# Patient Record
Sex: MALE | Race: WHITE | Employment: FULL TIME | URBAN - METROPOLITAN AREA
[De-identification: names, ages, dates, MRNs, and addresses within clinical notes are randomized per-mention and may not be internally consistent; named-entity substitution may affect disease eponyms.]

---

## 2017-09-05 ENCOUNTER — HOSPITAL ENCOUNTER (EMERGENCY)
Age: 47
Discharge: HOME | End: 2017-09-05
Attending: EMERGENCY MEDICINE
Payer: SELF-PAY

## 2017-09-05 VITALS
RESPIRATION RATE: 18 BRPM | HEART RATE: 76 BPM | BODY MASS INDEX: 23.8 KG/M2 | DIASTOLIC BLOOD PRESSURE: 78 MMHG | TEMPERATURE: 98.2 F | OXYGEN SATURATION: 100 % | WEIGHT: 170 LBS | HEIGHT: 71 IN | SYSTOLIC BLOOD PRESSURE: 118 MMHG

## 2017-09-05 DIAGNOSIS — M54.50 ACUTE RIGHT-SIDED LOW BACK PAIN WITHOUT SCIATICA: Primary | ICD-10-CM

## 2017-09-05 LAB
ALBUMIN SERPL-MCNC: 3.9 G/DL (ref 3.5–5)
ALBUMIN/GLOB SERPL: 0.9 {RATIO} (ref 1.2–3.5)
ALP SERPL-CCNC: 92 U/L (ref 50–136)
ALT SERPL-CCNC: 18 U/L (ref 12–65)
ANION GAP SERPL CALC-SCNC: 7 MMOL/L (ref 7–16)
AST SERPL-CCNC: 10 U/L (ref 15–37)
BASOPHILS # BLD: 0 K/UL (ref 0–0.2)
BASOPHILS NFR BLD: 0 % (ref 0–2)
BILIRUB SERPL-MCNC: 0.3 MG/DL (ref 0.2–1.1)
BUN SERPL-MCNC: 8 MG/DL (ref 6–23)
CALCIUM SERPL-MCNC: 9.3 MG/DL (ref 8.3–10.4)
CHLORIDE SERPL-SCNC: 105 MMOL/L (ref 98–107)
CO2 SERPL-SCNC: 27 MMOL/L (ref 21–32)
CREAT SERPL-MCNC: 1 MG/DL (ref 0.8–1.5)
DIFFERENTIAL METHOD BLD: ABNORMAL
EOSINOPHIL # BLD: 0.4 K/UL (ref 0–0.8)
EOSINOPHIL NFR BLD: 3 % (ref 0.5–7.8)
ERYTHROCYTE [DISTWIDTH] IN BLOOD BY AUTOMATED COUNT: 14 % (ref 11.9–14.6)
GLOBULIN SER CALC-MCNC: 4.3 G/DL (ref 2.3–3.5)
GLUCOSE SERPL-MCNC: 80 MG/DL (ref 65–100)
HCT VFR BLD AUTO: 45.6 % (ref 41.1–50.3)
HGB BLD-MCNC: 16 G/DL (ref 13.6–17.2)
IMM GRANULOCYTES # BLD: 0 K/UL (ref 0–0.5)
IMM GRANULOCYTES NFR BLD: 0.2 % (ref 0–5)
LYMPHOCYTES # BLD: 2.9 K/UL (ref 0.5–4.6)
LYMPHOCYTES NFR BLD: 24 % (ref 13–44)
MCH RBC QN AUTO: 31.7 PG (ref 26.1–32.9)
MCHC RBC AUTO-ENTMCNC: 35.1 G/DL (ref 31.4–35)
MCV RBC AUTO: 90.5 FL (ref 79.6–97.8)
MONOCYTES # BLD: 0.9 K/UL (ref 0.1–1.3)
MONOCYTES NFR BLD: 7 % (ref 4–12)
NEUTS SEG # BLD: 8 K/UL (ref 1.7–8.2)
NEUTS SEG NFR BLD: 66 % (ref 43–78)
PLATELET # BLD AUTO: 310 K/UL (ref 150–450)
PMV BLD AUTO: 9 FL (ref 10.8–14.1)
POTASSIUM SERPL-SCNC: 4 MMOL/L (ref 3.5–5.1)
PROT SERPL-MCNC: 8.2 G/DL (ref 6.3–8.2)
RBC # BLD AUTO: 5.04 M/UL (ref 4.23–5.67)
SODIUM SERPL-SCNC: 139 MMOL/L (ref 136–145)
WBC # BLD AUTO: 12.2 K/UL (ref 4.3–11.1)

## 2017-09-05 PROCEDURE — 85025 COMPLETE CBC W/AUTO DIFF WBC: CPT | Performed by: EMERGENCY MEDICINE

## 2017-09-05 PROCEDURE — 80053 COMPREHEN METABOLIC PANEL: CPT | Performed by: EMERGENCY MEDICINE

## 2017-09-05 PROCEDURE — 99283 EMERGENCY DEPT VISIT LOW MDM: CPT | Performed by: EMERGENCY MEDICINE

## 2017-09-05 RX ORDER — TRAMADOL HYDROCHLORIDE 50 MG/1
50 TABLET ORAL
Qty: 17 TAB | Refills: 0 | Status: SHIPPED | OUTPATIENT
Start: 2017-09-05

## 2017-09-05 NOTE — ED TRIAGE NOTES
Pt arrive c/o right flank pain that started yesterday. Denies injury or fall. Denies n/v/d. Denies dysuria.

## 2017-09-05 NOTE — ED NOTES
I have reviewed discharge instructions with the patient. The patient verbalized understanding. Prescription given. Pt education for medication including not to drink alcohol, drive, work, or make important decisions given. Pt verbalized understanding of teaching. No questions when given opportunity to ask. Patient ambulatory out of ED with steady gait in NAD. Esign not available.

## 2017-09-05 NOTE — DISCHARGE INSTRUCTIONS
Rest  Ice back 20-30 minutes 3-4 times a day for 3-4 days  Motrin 400mg every 6 hours      Rotator Cuff Problems: Care Instructions  Your Care Instructions  The rotator cuff is a group of tendons and muscles around the shoulder that keeps the shoulder joint stable and allows you to raise and rotate your arm. Over time, daily wear and exercise can cause the tendons to rub on the bones of your shoulder. This is called impingement. This condition may cause the tendons to bruise, degenerate, or tear. In many people, these problems do not cause pain. When they do cause pain, you can use rest, physical therapy, ice and heat, and anti-inflammatory medicine to reduce pain and swelling. If you still have pain after trying these treatments, you and your doctor can discuss having a steroid injection or surgery. Follow-up care is a key part of your treatment and safety. Be sure to make and go to all appointments, and call your doctor if you are having problems. It's also a good idea to know your test results and keep a list of the medicines you take. How can you care for yourself at home? · Be safe with medicines. Read and follow all instructions on the label. ¨ If the doctor gave you a prescription medicine for pain, take it as prescribed. ¨ If you are not taking a prescription pain medicine, ask your doctor if you can take an over-the-counter medicine. · Put ice or a cold pack on your shoulder for 10 to 20 minutes at a time. Try to do this every 1 to 2 hours for the next 3 days (when you are awake). Put a thin cloth between the ice pack and your skin. · After 3 days, put a warm, wet towel on your shoulder. This is to relax the muscles and increase blood flow. While holding the towel on your shoulder, lean forward so your arm hangs freely, and gently swing your arm back and forth like a pendulum. You also can do this standing under a warm shower. · Follow your doctor's advice for physical therapy.  When your doctor says it is okay, try these stretching exercises. Do them slowly to avoid injury. Put a warm, wet towel on your shoulder before exercising. Stop any exercise that increases pain. ¨ Range-of-motion exercises. If it is not too painful, stretch your arm in four directions: across the body, up the back, to the side, and overhead. ¨ Pendulum exercise. Lean forward and hold onto a table or the back of a chair with your good arm. Bend at the waist, letting the arm with the sore shoulder hang straight down. Swing your arm back and forth like a pendulum, then in circles that start small and slowly grow larger. This exercise does not use the arm muscles. Instead, use your legs and your hips to create movement that makes your arm swing freely. Try this for about 5 minutes, several times a day. ¨ Wall climbing (to the side). Stand with your side to a wall so that your fingers can just touch it. Then turn so your body is turned slightly toward the wall. Walk the fingers of your injured arm up the wall as high as pain permits. Try not to shrug your shoulder up toward your ear as you move your arm up. Hold that position for a count of 15 to 30 seconds. Walk your fingers down to the starting position. Repeat 2 to 4 times, trying to reach higher each time. ¨ Wall climbing (to the front). Face a wall, standing so your fingers can just touch it. Walk the fingers of your affected arm up the wall as high as pain permits. Try not to shrug your shoulder up toward your ear as you move your arm up. Hold that position for a count of 15 to 30 seconds. Slowly walk your fingers to the starting position. Repeat 2 to 4 times, trying to reach higher each time. · Rest your shoulder when you are not doing stretches and other exercises. Your doctor may tell you to wait for the pain to go away before doing exercises. Do not lift heavy bags of groceries, play sports, or do anything else that makes you twist or stress your shoulder.  Avoid activities where you move your affected arm above your head. When should you call for help? Call your doctor now or seek immediate medical care if:  · You have severe pain. · You cannot move your shoulder or arm. · You have tingling or numbness in your arm or hand. · Your arm or hand is cool or pale. Watch closely for changes in your health, and be sure to contact your doctor if:  · Your pain gets worse. · You have new or worse swelling in your arm or hand. · You do not get better as expected. Where can you learn more? Go to http://brett-tony.info/. Enter E207 in the search box to learn more about \"Rotator Cuff Problems: Care Instructions. \"  Current as of: March 21, 2017  Content Version: 11.3  © 3395-4601 [employer]. Care instructions adapted under license by [employer] (which disclaims liability or warranty for this information). If you have questions about a medical condition or this instruction, always ask your healthcare professional. [address] any warranty or liability for your use of this information.

## 2017-09-05 NOTE — LETTER
[facility] [facility] - [place], [place] EMERGENCY DEPT One [address] [address] 
[phone] Work/School Note Date: 9/5/2017 To Whom It May concern: Earlene Burch was seen and treated today in the emergency room by the following provider(s): 
Attending Provider: Nataliia Pretty MD. Earlene Burch may return to work on 9/7/17. Sincerely, Tigre Harper RN

## 2017-09-05 NOTE — ED PROVIDER NOTES
[address] Emergency Department    Chief Complaint:  Right scapular pain  HPI:  Belia Cohen is a 51 y.o. male who complaints of pain in his back. Location: On the right side. At the inferior margin of the scapula. Quality: sharp  Associated Signs/Symptoms: no shortness of breath, no cough, no chest pain  Duration/Timing: started yesterday. Comes and go. Worse with movement or activity. Severity: moderate  Alleviating: rest  Aggravating: movement  Patient denies any inciting factors. 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 Historian:   patient    Review of Systems:    CARDIO: Denies: chest pain, palpitations   RESP:   Denies: cough, shortness of breath, dyspnea on exertion  MUSC: Per HPI      Past Medical History:  Past Medical History:   Diagnosis Date    Hypertension        Past Surgical History:   Procedure Laterality Date    [de-identified] ORTHOPAEDIC         Social History     Social History    Marital status:      Spouse name: N/A    Number of children: N/A    Years of education: N/A     Social History Main Topics    Smoking status: Current Every Day Smoker     Packs/day: 0.50    Smokeless tobacco: Never Used    Alcohol use No    Drug use: No    Sexual activity: Not Asked     Other Topics Concern    None     Social History Narrative       Previous Medications    CYCLOBENZAPRINE (FLEXERIL) 10 MG TABLET    Take 1 Tab by mouth three (3) times daily as needed for Muscle Spasm(s). IBUPROFEN (MOTRIN) 800 MG TABLET    Take 1 Tab by mouth every eight (8) hours as needed for Pain (TAKE WITH FOOD). No Known Allergies    Physical Exam:    Vitals:    09/05/17 0757   BP: 120/87   Pulse: 90   Resp: 20   Temp: 98.5 °F (36.9 °C)   SpO2: 100%     Vital signs were reviewed.   Pulse oximetry interpretation: normal    Constitutional: , well appearing, nontoxic  Head:   Atraumatic, normo-cephalic  Neck:   supple, FROM, no lymphadenopathy, no meningismus, no bony tenderness  Cardiovascular: regular rate and rhythm, no murmur, 2+ radial pulses  Respiratory:  clear to auscultation with no wheezes, rales, ronchi  Back:    FROM, no CVA tenderness, no midline or bony tenderness  Musculoskeletal: Right shoulder without deformity or tenderness. Pain with abduction of the right shoulder. Pain with external rotation. Skin:   dry, no rashes  Neurologic:  alert, oriented, CN II-XII intact,   Psychiatric:  Speech pattern and content normal     ______________________________________________________________________  Progress:    Well-appearing 48-year-old male atraumatic right scapular pain. Worse with deep breath movement of the arm. Better with rest.  No falls or accidents reported. Patient was unable to sleep on the affected side last night. Pain is worse with movement of the arm particularly abduction external rotation and resisted movement. It is tender to palpation at the inferior margin of the scapula.      ==================================================================  Medical Decision Making:  Differential Diagnosis for this patient includes: traumatic injury, rotator cuff injury,    This is a new problem that does need additional workup  Labs/Radiographs/ECG were ordered: no  Old records were reviewed: no  CT/US/XRay/MRI were visualized: no    The patient's problem is: new,previously undiagnosed  The Diagnostic Options are: minimal risk  The Management Options are: moderate risk  _____________________________________________________________________  Assessment/Plan:    Right shoulder pain. Possible rotator cuff problem  iice rest and anti-inflammatories.   ______________________________________________________________________    Condition:  good  Disposition:  home  Diagnosis:  Right upper back pain      Leobardo Dumont M.D.

## 2017-09-05 NOTE — LETTER
[facility] [facility] - [place] EMERGENCY DEPT One [address] [address] [phone] Work/School Note Date: 9/5/2017 To Whom It May concern: Dianelys Schmidt was seen and treated today in the emergency room by the following provider(s): 
Attending Provider: Veda Ojeda MD. Dianelys Schmidt may return to work on 9/6.  
 
Sincerely, 
 
 
 
 
Veda Ojeda MD

## 2020-12-16 ENCOUNTER — HOSPITAL ENCOUNTER (EMERGENCY)
Age: 50
Discharge: HOME | End: 2020-12-16
Attending: EMERGENCY MEDICINE

## 2020-12-16 ENCOUNTER — APPOINTMENT (OUTPATIENT)
Dept: GENERAL RADIOLOGY | Age: 50
End: 2020-12-16
Attending: EMERGENCY MEDICINE

## 2020-12-16 VITALS
TEMPERATURE: 97.8 F | DIASTOLIC BLOOD PRESSURE: 80 MMHG | RESPIRATION RATE: 16 BRPM | HEART RATE: 67 BPM | BODY MASS INDEX: 22.54 KG/M2 | HEIGHT: 71 IN | WEIGHT: 161 LBS | OXYGEN SATURATION: 99 % | SYSTOLIC BLOOD PRESSURE: 121 MMHG

## 2020-12-16 DIAGNOSIS — R07.9 CHEST PAIN, UNSPECIFIED TYPE: Primary | ICD-10-CM

## 2020-12-16 LAB
ALBUMIN SERPL-MCNC: 3.9 G/DL (ref 3.5–5)
ALBUMIN/GLOB SERPL: 1 {RATIO} (ref 1.2–3.5)
ALP SERPL-CCNC: 79 U/L (ref 50–136)
ALT SERPL-CCNC: 18 U/L (ref 12–65)
ANION GAP SERPL CALC-SCNC: 4 MMOL/L (ref 7–16)
AST SERPL-CCNC: 14 U/L (ref 15–37)
ATRIAL RATE: 80 BPM
BASOPHILS # BLD: 0.1 K/UL (ref 0–0.2)
BASOPHILS NFR BLD: 1 % (ref 0–2)
BILIRUB SERPL-MCNC: 0.3 MG/DL (ref 0.2–1.1)
BUN SERPL-MCNC: 9 MG/DL (ref 6–23)
CALCIUM SERPL-MCNC: 9.4 MG/DL (ref 8.3–10.4)
CALCULATED P AXIS, ECG09: 76 DEGREES
CALCULATED R AXIS, ECG10: 83 DEGREES
CALCULATED T AXIS, ECG11: 56 DEGREES
CHLORIDE SERPL-SCNC: 108 MMOL/L (ref 98–107)
CO2 SERPL-SCNC: 27 MMOL/L (ref 21–32)
CREAT SERPL-MCNC: 0.89 MG/DL (ref 0.8–1.5)
DIAGNOSIS, 93000: NORMAL
DIFFERENTIAL METHOD BLD: ABNORMAL
EOSINOPHIL # BLD: 0.4 K/UL (ref 0–0.8)
EOSINOPHIL NFR BLD: 3 % (ref 0.5–7.8)
ERYTHROCYTE [DISTWIDTH] IN BLOOD BY AUTOMATED COUNT: 12.9 % (ref 11.9–14.6)
GLOBULIN SER CALC-MCNC: 4.1 G/DL (ref 2.3–3.5)
GLUCOSE SERPL-MCNC: 79 MG/DL (ref 65–100)
HCT VFR BLD AUTO: 43.3 % (ref 41.1–50.3)
HGB BLD-MCNC: 14.4 G/DL (ref 13.6–17.2)
IMM GRANULOCYTES # BLD AUTO: 0 K/UL (ref 0–0.5)
IMM GRANULOCYTES NFR BLD AUTO: 0 % (ref 0–5)
LYMPHOCYTES # BLD: 4 K/UL (ref 0.5–4.6)
LYMPHOCYTES NFR BLD: 34 % (ref 13–44)
MCH RBC QN AUTO: 31.9 PG (ref 26.1–32.9)
MCHC RBC AUTO-ENTMCNC: 33.3 G/DL (ref 31.4–35)
MCV RBC AUTO: 95.8 FL (ref 79.6–97.8)
MONOCYTES # BLD: 0.8 K/UL (ref 0.1–1.3)
MONOCYTES NFR BLD: 7 % (ref 4–12)
NEUTS SEG # BLD: 6.5 K/UL (ref 1.7–8.2)
NEUTS SEG NFR BLD: 56 % (ref 43–78)
NRBC # BLD: 0 K/UL (ref 0–0.2)
P-R INTERVAL, ECG05: 146 MS
PLATELET # BLD AUTO: 306 K/UL (ref 150–450)
PMV BLD AUTO: 8.7 FL (ref 9.4–12.3)
POTASSIUM SERPL-SCNC: 4.1 MMOL/L (ref 3.5–5.1)
PROT SERPL-MCNC: 8 G/DL (ref 6.3–8.2)
Q-T INTERVAL, ECG07: 376 MS
QRS DURATION, ECG06: 96 MS
QTC CALCULATION (BEZET), ECG08: 433 MS
RBC # BLD AUTO: 4.52 M/UL (ref 4.23–5.6)
SODIUM SERPL-SCNC: 139 MMOL/L (ref 136–145)
TROPONIN-HIGH SENSITIVITY: 5.5 PG/ML (ref 0–14)
TROPONIN-HIGH SENSITIVITY: 5.7 PG/ML (ref 0–14)
VENTRICULAR RATE, ECG03: 80 BPM
WBC # BLD AUTO: 11.8 K/UL (ref 4.3–11.1)

## 2020-12-16 PROCEDURE — 85025 COMPLETE CBC W/AUTO DIFF WBC: CPT

## 2020-12-16 PROCEDURE — 99284 EMERGENCY DEPT VISIT MOD MDM: CPT

## 2020-12-16 PROCEDURE — 74011250636 HC RX REV CODE- 250/636: Performed by: EMERGENCY MEDICINE

## 2020-12-16 PROCEDURE — 81003 URINALYSIS AUTO W/O SCOPE: CPT

## 2020-12-16 PROCEDURE — 84484 ASSAY OF TROPONIN QUANT: CPT

## 2020-12-16 PROCEDURE — 71046 X-RAY EXAM CHEST 2 VIEWS: CPT

## 2020-12-16 PROCEDURE — 93005 ELECTROCARDIOGRAM TRACING: CPT | Performed by: EMERGENCY MEDICINE

## 2020-12-16 PROCEDURE — 80053 COMPREHEN METABOLIC PANEL: CPT

## 2020-12-16 PROCEDURE — 96374 THER/PROPH/DIAG INJ IV PUSH: CPT

## 2020-12-16 RX ORDER — KETOROLAC TROMETHAMINE 30 MG/ML
30 INJECTION, SOLUTION INTRAMUSCULAR; INTRAVENOUS
Status: COMPLETED | OUTPATIENT
Start: 2020-12-16 | End: 2020-12-16

## 2020-12-16 RX ADMIN — KETOROLAC TROMETHAMINE 30 MG: 30 INJECTION, SOLUTION INTRAMUSCULAR at 14:19

## 2020-12-16 NOTE — ED NOTES
I have reviewed discharge instructions with the patient. The patient verbalized understanding. Patient left ED via Discharge Method: ambulatory to Home with self  Opportunity for questions and clarification provided. Patient given 0 scripts. To continue your aftercare when you leave the hospital, you may receive an automated call from our care team to check in on how you are doing. This is a free service and part of our promise to provide the best care and service to meet your aftercare needs.  If you have questions, or wish to unsubscribe from this service please call [phone]. Thank you for Choosing our [address] [place] [address] Emergency Department.

## 2020-12-16 NOTE — DISCHARGE INSTRUCTIONS

## 2020-12-16 NOTE — ED TRIAGE NOTES
Chest pain intermittently x several days. No hx of CP/heart problems. Pt does not take blood thinners at home. VSS in triage.

## 2022-10-15 ENCOUNTER — HOSPITAL ENCOUNTER (EMERGENCY)
Age: 52
Discharge: HOME | End: 2022-10-15
Attending: EMERGENCY MEDICINE

## 2022-10-15 VITALS
SYSTOLIC BLOOD PRESSURE: 128 MMHG | DIASTOLIC BLOOD PRESSURE: 88 MMHG | HEIGHT: 71 IN | HEART RATE: 66 BPM | BODY MASS INDEX: 23.24 KG/M2 | WEIGHT: 166 LBS | TEMPERATURE: 97.8 F | RESPIRATION RATE: 18 BRPM | OXYGEN SATURATION: 100 %

## 2022-10-15 DIAGNOSIS — K04.7 DENTAL INFECTION: Primary | ICD-10-CM

## 2022-10-15 PROCEDURE — 99283 EMERGENCY DEPT VISIT LOW MDM: CPT

## 2022-10-15 PROCEDURE — 6370000000 HC RX 637 (ALT 250 FOR IP): Performed by: PHYSICIAN ASSISTANT

## 2022-10-15 RX ORDER — IBUPROFEN 800 MG/1
800 TABLET ORAL
Qty: 21 TABLET | Refills: 0 | Status: SHIPPED | OUTPATIENT
Start: 2022-10-15 | End: 2022-10-22

## 2022-10-15 RX ORDER — AMOXICILLIN 500 MG/1
500 CAPSULE ORAL 2 TIMES DAILY
Qty: 14 CAPSULE | Refills: 0 | Status: SHIPPED | OUTPATIENT
Start: 2022-10-15 | End: 2022-10-22

## 2022-10-15 RX ORDER — AMOXICILLIN 500 MG/1
500 CAPSULE ORAL
Status: COMPLETED | OUTPATIENT
Start: 2022-10-15 | End: 2022-10-15

## 2022-10-15 RX ORDER — IBUPROFEN 800 MG/1
800 TABLET ORAL
Status: COMPLETED | OUTPATIENT
Start: 2022-10-15 | End: 2022-10-15

## 2022-10-15 RX ADMIN — IBUPROFEN 800 MG: 800 TABLET, FILM COATED ORAL at 13:37

## 2022-10-15 RX ADMIN — AMOXICILLIN 500 MG: 500 CAPSULE ORAL at 13:37

## 2022-10-15 ASSESSMENT — PAIN - FUNCTIONAL ASSESSMENT: PAIN_FUNCTIONAL_ASSESSMENT: 0-10

## 2022-10-15 ASSESSMENT — PAIN DESCRIPTION - LOCATION
LOCATION: TEETH
LOCATION: TEETH

## 2022-10-15 ASSESSMENT — PAIN SCALES - GENERAL
PAINLEVEL_OUTOF10: 6
PAINLEVEL_OUTOF10: 6

## 2022-10-15 ASSESSMENT — ENCOUNTER SYMPTOMS
VOMITING: 0
SHORTNESS OF BREATH: 0
NAUSEA: 0

## 2022-10-15 ASSESSMENT — PAIN DESCRIPTION - ORIENTATION
ORIENTATION: LEFT;LOWER
ORIENTATION: LEFT;LOWER

## 2022-10-15 ASSESSMENT — PAIN DESCRIPTION - DESCRIPTORS: DESCRIPTORS: ACHING

## 2022-10-15 NOTE — ED PROVIDER NOTES
Emergency Department Provider Note                   PCP:                None Provider               Age: 45 y.o. Sex: male       ICD-10-CM    1. Dental infection  K04.7           DISPOSITION Discharge - Pending Orders Complete 10/15/2022 01:20:59 PM        MDM  Number of Diagnoses or Management Options  Dental infection  Diagnosis management comments:     Patient is a well-appearing 71-year-old male presenting to the department today for evaluation of dental pain. He has some tenderness along to avulsed teeth at the posterior aspect of the left lower jaw. There is no identifiable abscess. No submandibular induration or swelling, tolerating secretions well. We will start patient on antibiotics and have him follow-up with dentist as planned next week. No orders of the defined types were placed in this encounter. Medications   amoxicillin (AMOXIL) capsule 500 mg (has no administration in time range)   ibuprofen (ADVIL;MOTRIN) tablet 800 mg (has no administration in time range)       New Prescriptions    AMOXICILLIN (AMOXIL) 500 MG CAPSULE    Take 1 capsule by mouth 2 times daily for 7 days    IBUPROFEN (ADVIL;MOTRIN) 800 MG TABLET    Take 1 tablet by mouth 3 times daily (with meals) for 7 days        Bhavin Baker is a 45 y.o. male who presents to the Emergency Department with chief complaint of    Chief Complaint   Patient presents with    Dental Pain      71-year-old well-appearing male presents to the emergency department today for evaluation of pain in the left lower jaw. He states that he has 2 broken teeth on his bottom jawline and thinks that one may be getting infected. He called his dentist for an appointment but was unable to get in until next week so he came here hopefully to get some antibiotics. He denies any trouble swallowing, facial swelling, sore throat, fever. Reports history of similar in the past.    The history is provided by the patient.  No language  was used. Review of Systems   Constitutional:  Negative for activity change, chills and fever. HENT:  Positive for dental problem. Respiratory:  Negative for shortness of breath. Gastrointestinal:  Negative for nausea and vomiting. Musculoskeletal:  Negative for neck pain. Neurological:  Negative for dizziness and headaches. Past Medical History:   Diagnosis Date    Hypertension         Past Surgical History:   Procedure Laterality Date    ORTHOPEDIC SURGERY          No family history on file. Social History     Socioeconomic History    Marital status:    Tobacco Use    Smoking status: Every Day     Packs/day: 0.50     Types: Cigarettes    Smokeless tobacco: Never   Substance and Sexual Activity    Alcohol use: No    Drug use: No         Patient has no known allergies. Previous Medications    CYCLOBENZAPRINE (FLEXERIL) 10 MG TABLET    Take 10 mg by mouth 3 times daily as needed    TRAMADOL (ULTRAM) 50 MG TABLET    Take 50 mg by mouth every 6 hours as needed. Vitals signs and nursing note reviewed. Patient Vitals for the past 4 hrs:   Temp Pulse Resp BP SpO2   10/15/22 1316 97.8 °F (36.6 °C) 66 18 128/88 100 %          Physical Exam  Vitals and nursing note reviewed. Constitutional:       General: He is not in acute distress. Appearance: Normal appearance. HENT:      Head: Normocephalic and atraumatic. Nose: Nose normal.      Mouth/Throat:      Lips: Pink. Mouth: Mucous membranes are moist.      Pharynx: Oropharynx is clear. No pharyngeal swelling. Comments: No submandibular swelling or induration. Eyes:      Conjunctiva/sclera: Conjunctivae normal.   Pulmonary:      Effort: Pulmonary effort is normal.   Lymphadenopathy:      Cervical: No cervical adenopathy. Skin:     General: Skin is warm and dry. Capillary Refill: Capillary refill takes less than 2 seconds. Neurological:      General: No focal deficit present.       Mental Status: He is alert and oriented to person, place, and time. Psychiatric:         Mood and Affect: Mood normal.         Thought Content: Thought content normal.         Judgment: Judgment normal.        Procedures    No results found for any visits on 10/15/22. No orders to display                       Voice dictation software was used during the making of this note. This software is not perfect and grammatical and other typographical errors may be present. This note has not been completely proofread for errors.         Paul, [id number]  10/15/22 0728

## 2022-10-15 NOTE — ED NOTES
I have reviewed discharge instructions with the patient. The patient verbalized understanding. Patient left ED via Discharge Method: ambulatory to Home with self. Opportunity for questions and clarification provided. Patient given 2 scripts. To continue your aftercare when you leave the hospital, you may receive an automated call from our care team to check in on how you are doing. This is a free service and part of our promise to provide the best care and service to meet your aftercare needs.  If you have questions, or wish to unsubscribe from this service please call [phone]. Thank you for Choosing our [facility] Emergency Department.         Ling Kwon RN  10/15/22 2430

## 2022-10-15 NOTE — ED TRIAGE NOTES
Patient ambulatory to triage with CO pain on left lower teeth. Unable to see his dentist until this coming week but increase in pain and swelling.  Denies fevers/chills

## 2022-10-15 NOTE — DISCHARGE INSTRUCTIONS
Take antibiotics as prescribed for full course of treatment. Alternate tylenol and motrin as needed for pain. You can take tylenol every 4 hours as needed. You can take ibuprofen every 8 hours as needed. Keep your appointment with your Dentist as scheduled for next week. Follow-up with your PCP in 1 to 2 days if no improvement. Return to the ER for any new or worsening symptoms.

## 2022-12-18 ENCOUNTER — HOSPITAL ENCOUNTER (EMERGENCY)
Age: 52
Discharge: HOME | End: 2022-12-18
Attending: EMERGENCY MEDICINE
Payer: COMMERCIAL

## 2022-12-18 ENCOUNTER — APPOINTMENT (OUTPATIENT)
Dept: GENERAL RADIOLOGY | Age: 52
End: 2022-12-18
Payer: COMMERCIAL

## 2022-12-18 VITALS
HEIGHT: 71 IN | WEIGHT: 160 LBS | HEART RATE: 64 BPM | RESPIRATION RATE: 16 BRPM | BODY MASS INDEX: 22.4 KG/M2 | SYSTOLIC BLOOD PRESSURE: 130 MMHG | OXYGEN SATURATION: 98 % | TEMPERATURE: 98.8 F | DIASTOLIC BLOOD PRESSURE: 74 MMHG

## 2022-12-18 DIAGNOSIS — M79.642 HAND PAIN, LEFT: ICD-10-CM

## 2022-12-18 DIAGNOSIS — S61.211A LACERATION OF LEFT INDEX FINGER WITHOUT FOREIGN BODY WITHOUT DAMAGE TO NAIL, INITIAL ENCOUNTER: Primary | ICD-10-CM

## 2022-12-18 PROCEDURE — 12001 RPR S/N/AX/GEN/TRNK 2.5CM/<: CPT

## 2022-12-18 PROCEDURE — 6360000002 HC RX W HCPCS: Performed by: EMERGENCY MEDICINE

## 2022-12-18 PROCEDURE — 6370000000 HC RX 637 (ALT 250 FOR IP): Performed by: EMERGENCY MEDICINE

## 2022-12-18 PROCEDURE — 90714 TD VACC NO PRESV 7 YRS+ IM: CPT | Performed by: EMERGENCY MEDICINE

## 2022-12-18 PROCEDURE — 90471 IMMUNIZATION ADMIN: CPT | Performed by: EMERGENCY MEDICINE

## 2022-12-18 PROCEDURE — 73130 X-RAY EXAM OF HAND: CPT

## 2022-12-18 PROCEDURE — 99284 EMERGENCY DEPT VISIT MOD MDM: CPT

## 2022-12-18 RX ORDER — HYDROCODONE BITARTRATE AND ACETAMINOPHEN 10; 325 MG/1; MG/1
1 TABLET ORAL
Status: COMPLETED | OUTPATIENT
Start: 2022-12-18 | End: 2022-12-18

## 2022-12-18 RX ORDER — TETANUS AND DIPHTHERIA TOXOIDS ADSORBED 2; 2 [LF]/.5ML; [LF]/.5ML
0.5 INJECTION INTRAMUSCULAR
Status: COMPLETED | OUTPATIENT
Start: 2022-12-18 | End: 2022-12-18

## 2022-12-18 RX ORDER — IBUPROFEN 800 MG/1
800 TABLET ORAL
Status: COMPLETED | OUTPATIENT
Start: 2022-12-18 | End: 2022-12-18

## 2022-12-18 RX ADMIN — IBUPROFEN 800 MG: 800 TABLET, FILM COATED ORAL at 12:36

## 2022-12-18 RX ADMIN — HYDROCODONE BITARTRATE AND ACETAMINOPHEN 1 TABLET: 10; 325 TABLET ORAL at 12:36

## 2022-12-18 RX ADMIN — TETANUS AND DIPHTHERIA TOXOIDS ADSORBED 0.5 ML: 2; 2 INJECTION INTRAMUSCULAR at 12:38

## 2022-12-18 ASSESSMENT — PAIN SCALES - GENERAL: PAINLEVEL_OUTOF10: 0

## 2022-12-18 NOTE — Clinical Note
Nehal West was seen and treated in our emergency department on 12/18/2022. He may return to work on . Light duty at work for 1 week  No prolonged submersion of hand underwater     If you have any questions or concerns, please don't hesitate to call.       Katharine Pham MD

## 2022-12-18 NOTE — Clinical Note
Diego Dennis was seen and treated in our emergency department on 12/18/2022. He may return to work on . Light duty at work for 1 week  No prolonged submersion of hand underwater     If you have any questions or concerns, please don't hesitate to call.       Natalie Cobb MD

## 2022-12-18 NOTE — DISCHARGE INSTRUCTIONS
Antibiotic ointment of choice for three days  Alternate 4 ibuprofen every 8 hours with 2 extra-strength tylenol every 6 hours  Keep stitches in for 7-10 days  May wash gently, but do NOT soak or submerge. Keep clean and dry with bandage  Return if worse in any way  Follow up with atul Jennings, or Dr. Ari Womack if you havent one.     Check the two-point discrimination using a paperclip for example set with the tips 1 cm apart with eyes closed have someone alternate touching with 1 or 2 tips to see if the nerve has woken up    If still having numbness or inaccurate sensation/feeling, follow-up with Dr. Sriram Pérez from hand surgery

## 2022-12-18 NOTE — ED NOTES
Pt second finger cleaned with wound cleanser and wrapped with non stick dressing and secured with coban. Pt tolerated well.       Maria Eugenia Perez RN  12/18/22 7508

## 2022-12-18 NOTE — ED NOTES
I have reviewed discharge instructions with the patient. The patient verbalized understanding. Patient left ED via Discharge Method: ambulatory to Home with supervisor. Opportunity for questions and clarification provided. Patient given 0 scripts. To continue your aftercare when you leave the hospital, you may receive an automated call from our care team to check in on how you are doing. This is a free service and part of our promise to provide the best care and service to meet your aftercare needs.  If you have questions, or wish to unsubscribe from this service please call [phone]. Thank you for Choosing our [employer] Emergency Department.           Mumtaz De Santiago RN  12/18/22 9900

## 2023-01-01 ASSESSMENT — ENCOUNTER SYMPTOMS: COLOR CHANGE: 0

## 2023-01-01 NOTE — ED PROVIDER NOTES
Laceration    [employer] Emergency Department Provider Note                   PCP:                On File Not (Inactive)               Age: 47 y.o. Sex: male       ICD-10-CM    1. Laceration of left index finger without foreign body without damage to nail, initial encounter  S61.211A       2. Hand pain, left  M79.642           DISPOSITION Decision To Discharge 12/18/2022 12:11:31 PM       Discharge Medication List as of 12/18/2022 12:42 PM          Orders Placed This Encounter   Procedures    XR HAND LEFT (MIN 3 VIEWS)         Kaya Frey is a 47 y.o. male who presents to the Emergency Department with chief complaint of  No chief complaint on file. Chief complaint : Finger laceration    HISTORY OF PRESENT ILLNESS :  Location : Left index    Quality : Tender    Quantity : 2 cm    Timing : Just prior to arrival    Severity : Moderate    Context : Got caught between 2 rollers on the machine at work    Alleviating / exacerbating factors : No remedies attempted    Associated Symptoms : No numbness    -------------------------------    SOCIAL HISTORY : , smokes cigarettes, no alcohol        Review of Systems   Musculoskeletal:  Positive for arthralgias and joint swelling. Skin:  Positive for wound. Negative for color change. Neurological:  Negative for weakness and numbness. All other systems reviewed and are negative. All other systems reviewed and are negative. Past Medical History:   Diagnosis Date    Hypertension         Past Surgical History:   Procedure Laterality Date    ORTHOPEDIC SURGERY          History reviewed. No pertinent family history. Social Connections: Not on file        No Known Allergies     Vitals signs and nursing note reviewed. No data found. Physical Exam  Vitals and nursing note reviewed. Constitutional:       General: He is not in acute distress. Appearance: Normal appearance. He is not ill-appearing.    HENT:      Head: Normocephalic and Patient: Juan Antonio Shaw    Procedure Summary     Date:  05/20/20 Room / Location:  [facility] ENDOSCOPY 2 / [facility] ENDOSCOPY    Anesthesia Start:  1515 Anesthesia Stop:  1527    Procedure:  SIGMOIDOSCOPY FLEXIBLE (N/A ) Diagnosis:       Squamous cell carcinoma of perianal region      (Squamous cell carcinoma of perianal region [C44.520])    Surgeon:  Jarek Gordillo DO Provider:  Luna Morales CRNA    Anesthesia Type:  MAC ASA Status:  2          Anesthesia Type: MAC    Vitals  No vitals data found for the desired time range.          Post Anesthesia Care and Evaluation    Patient location during evaluation: bedside  Patient participation: complete - patient participated  Level of consciousness: awake  Pain score: 0  Pain management: adequate  Airway patency: patent  Anesthetic complications: No anesthetic complications  PONV Status: none  Cardiovascular status: acceptable  Respiratory status: acceptable  Hydration status: acceptable       atraumatic. Right Ear: External ear normal.      Left Ear: External ear normal.      Nose: Nose normal. No rhinorrhea. Eyes:      General: No scleral icterus. Right eye: No discharge. Left eye: No discharge. Extraocular Movements: Extraocular movements intact. Pulmonary:      Effort: Pulmonary effort is normal. No respiratory distress. Musculoskeletal:         General: Tenderness present. No signs of injury. Hands:       Cervical back: Normal range of motion and neck supple. Skin:     General: Skin is warm and dry. Coloration: Skin is not jaundiced or pale. Neurological:      General: No focal deficit present. Mental Status: He is alert and oriented to person, place, and time. Mental status is at baseline.       Gait: Gait normal.   Psychiatric:         Mood and Affect: Mood normal.         Behavior: Behavior normal.        MDM  Number of Diagnoses or Management Options  Hand pain, left: new, needed workup     Amount and/or Complexity of Data Reviewed  Tests in the radiology section of CPT®: reviewed and ordered  Decide to obtain previous medical records or to obtain history from someone other than the patient: yes    Risk of Complications, Morbidity, and/or Mortality  Presenting problems: low  Diagnostic procedures: low  Management options: low    Patient Progress  Patient progress: improved      Lac Repair    Date/Time: 1/1/2023 11:13 AM  Performed by: Sivan Ayala MD  Authorized by: Sivan Ayala MD     Consent:     Consent obtained:  Verbal    Consent given by:  Patient    Risks discussed:  Pain    Alternatives discussed:  No treatment  Anesthesia:     Anesthesia method:  Local infiltration    Local anesthetic:  Lidocaine 1% WITH epi  Laceration details:     Location:  Finger    Finger location:  L index finger    Length (cm):  2  Pre-procedure details:     Preparation:  Patient was prepped and draped in usual sterile fashion  Treatment:     Area cleansed with:  Betadine    Amount of cleaning:  Standard    Irrigation solution:  Sterile saline    Irrigation method:  Syringe    Debridement:  None  Skin repair:     Repair method:  Sutures    Suture size:  4-0    Suture material:  Prolene    Suture technique:  Simple interrupted    Number of sutures:  5  Approximation:     Approximation:  Close  Repair type:     Repair type:  Simple  Post-procedure details:     Dressing:  Antibiotic ointment    Procedure completion:  Tolerated well, no immediate complications    Labs Reviewed - No data to display     XR HAND LEFT (MIN 3 VIEWS)   Final Result      1. No acute osseous abnormality. 2. Small focus of soft tissue gas along the palmar aspect of the second digit. No radiopaque foreign body. Marjorie Coma Scale  Eye Opening: Spontaneous  Best Verbal Response: Oriented  Best Motor Response: Obeys commands  Marjorie Coma Scale Score: 15                     Voice dictation software was used during the making of this note. This software is not perfect and grammatical and other typographical errors may be present. This note has not been completely proofread for errors.        Gonzalo Hernandez MD  01/01/23 3552

## 2024-12-13 ENCOUNTER — HOSPITAL ENCOUNTER (EMERGENCY)
Age: 54
Discharge: HOME | End: 2024-12-13
Payer: COMMERCIAL

## 2024-12-13 ENCOUNTER — APPOINTMENT (OUTPATIENT)
Dept: CT IMAGING | Age: 54
End: 2024-12-13
Payer: COMMERCIAL

## 2024-12-13 VITALS
SYSTOLIC BLOOD PRESSURE: 119 MMHG | WEIGHT: 168 LBS | DIASTOLIC BLOOD PRESSURE: 62 MMHG | TEMPERATURE: 98.1 F | OXYGEN SATURATION: 100 % | RESPIRATION RATE: 18 BRPM | HEART RATE: 66 BPM | HEIGHT: 71 IN | BODY MASS INDEX: 23.52 KG/M2

## 2024-12-13 DIAGNOSIS — S09.90XA INJURY OF HEAD, INITIAL ENCOUNTER: Primary | ICD-10-CM

## 2024-12-13 DIAGNOSIS — M54.2 NECK PAIN: ICD-10-CM

## 2024-12-13 PROCEDURE — 6370000000 HC RX 637 (ALT 250 FOR IP): Performed by: NURSE PRACTITIONER

## 2024-12-13 PROCEDURE — 70450 CT HEAD/BRAIN W/O DYE: CPT

## 2024-12-13 PROCEDURE — 99284 EMERGENCY DEPT VISIT MOD MDM: CPT

## 2024-12-13 PROCEDURE — 72125 CT NECK SPINE W/O DYE: CPT

## 2024-12-13 RX ORDER — ACETAMINOPHEN 500 MG
1000 TABLET ORAL
Status: COMPLETED | OUTPATIENT
Start: 2024-12-13 | End: 2024-12-13

## 2024-12-13 RX ADMIN — ACETAMINOPHEN 1000 MG: 500 TABLET, FILM COATED ORAL at 14:53

## 2024-12-13 ASSESSMENT — PAIN - FUNCTIONAL ASSESSMENT: PAIN_FUNCTIONAL_ASSESSMENT: 0-10

## 2024-12-13 ASSESSMENT — PAIN DESCRIPTION - INTENSITY: RATING_2: 8

## 2024-12-13 ASSESSMENT — PAIN DESCRIPTION - LOCATION
LOCATION_2: NECK
LOCATION: HEAD

## 2024-12-13 ASSESSMENT — PAIN SCALES - GENERAL: PAINLEVEL_OUTOF10: 7

## 2024-12-13 NOTE — ED PROVIDER NOTES
Emergency Department Provider Note       PCP: Not, On File (Inactive)   Age: 54 y.o.   Sex: male     DISPOSITION Decision To Discharge 12/13/2024 03:29:27 PM    ICD-10-CM    1. Injury of head, initial encounter  S09.90XA       2. Neck pain  M54.2           Medical Decision Making     Well-appearing 54-year-old male presents emergency department today from work after head injury.  He appears in no acute distress.  No focal neurological deficits noted on exam.  Reports some tenderness with palpation to the cervical paraspinal region and cervical spine.  Otherwise, moving all extremities without difficulty.  Ambulatory with normal, steady gait.  No midline tenderness with palpation to the thoracic or lumbar spine.  No tenderness with palpation to the extremities.  Imaging shows no acute process.  Conservative treatments for pain discussed.  Concussion precautions discussed.  Will refer to occupational health for follow-up.  ER return precautions discussed but patient appears stable and appropriate for discharge.     1 acute, uncomplicated illness or injury.  Shared medical decision making was utilized in creating the patients health plan today.  I independently ordered and reviewed each unique test.           I interpreted the CT Scan no intracranial hemorrhage.  No acute fracture.              History     54-year-old male presents to the emergency department today for evaluation after a head injury that occurred at work.  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  He states that he was kneeled down, holding the cellophane so that it did not tear.  Someone came up to speak to him when he stood up, hitting his head on the palate.  He states that he did not lose consciousness but everything went black.  He states he was still able to hear.  He reports feeling pain in the top of his head that went down to his neck.  He denies other injury or pain.  He denies any

## 2024-12-13 NOTE — ED TRIAGE NOTES
Pt. C/O head injury at work. Positive LOC. Pt. Reports blurry vision. Pt. Reports neck pain. Pt.placed in C-collar in triage.

## 2024-12-13 NOTE — DISCHARGE INSTRUCTIONS
Your CT scans are normal.  As we discussed, there is a chance you may have a concussion.  You may have intermittent headaches, dizziness, blurred vision, feeling off balance, short-term memory loss, difficulty sleeping or wanting to sleep more than usual, etc.  The symptoms can last typically 2 to 4 weeks.  Take Tylenol or ibuprofen if needed for pain.  Avoid screen time for the next few days.  Follow-up with occupational health.  Return to the emergency department if you develop any new, worsening, or concerning symptoms.

## 2025-05-22 NOTE — ED PROVIDER NOTES
"Patient's FSGs are controlled on current medication regimen.  Last A1c reviewed-   Lab Results   Component Value Date    HGBA1C 6.4 11/27/2024     Most recent fingerstick glucose reviewed- No results for input(s): "POCTGLUCOSE" in the last 24 hours.  Current correctional scale  Low  Maintain anti-hyperglycemic dose as follows-   Antihyperglycemics (From admission, onward)      Start     Stop Route Frequency Ordered    05/21/25 2100  insulin glargine U-100 (Lantus) injection 5 Units         -- SubQ Every 12 hours 05/21/25 1737    05/21/25 1836  insulin aspart U-100 injection 0-5 Units         -- SubQ Before meals & nightly PRN 05/21/25 1737          Hold Oral hypoglycemics while patient is in the hospital.  " 18-year-old white male with no known medical history awoke in the night 3 nights ago with entire body \"numbness\". EMS was called to the house but he declined transport. Today he developed chest discomfort radiating to his left shoulder and arm. Pain has been intermittent. No aggravating or alleviating factors. No associated nausea, vomiting, fever or shortness of breath. The history is provided by the patient. Chest Pain   Pertinent negatives include no abdominal pain, no back pain, no cough, no fever, no headaches, no nausea, no shortness of breath and no vomiting. Past Medical History:   Diagnosis Date    Hypertension        Past Surgical History:   Procedure Laterality Date    HX ORTHOPAEDIC           No family history on file.     Social History     Socioeconomic History    Marital status:      Spouse name: Not on file    Number of children: Not on file    Years of education: Not on file    Highest education level: Not on file   Occupational History    Not on file   Social Needs    Financial resource strain: Not on file    Food insecurity     Worry: Not on file     Inability: Not on file    Transportation needs     Medical: Not on file     Non-medical: Not on file   Tobacco Use    Smoking status: Current Every Day Smoker     Packs/day: 0.50    Smokeless tobacco: Never Used   Substance and Sexual Activity    Alcohol use: No    Drug use: No    Sexual activity: Not on file   Lifestyle    Physical activity     Days per week: Not on file     Minutes per session: Not on file    Stress: Not on file   Relationships    Social connections     Talks on phone: Not on file     Gets together: Not on file     Attends Worship service: Not on file     Active member of club or organization: Not on file     Attends meetings of clubs or organizations: Not on file     Relationship status: Not on file    Intimate partner violence     Fear of current or ex partner: Not on file Emotionally abused: Not on file     Physically abused: Not on file     Forced sexual activity: Not on file   Other Topics Concern    Not on file   Social History Narrative    Not on file         ALLERGIES: Patient has no known allergies. Review of Systems   Constitutional: Negative for fever. HENT: Negative for congestion. Respiratory: Negative for cough and shortness of breath. Cardiovascular: Positive for chest pain. Negative for leg swelling. Gastrointestinal: Negative for abdominal pain, nausea and vomiting. Genitourinary: Negative for dysuria. Musculoskeletal: Negative for back pain and neck pain. Skin: Negative for rash. Neurological: Negative for headaches. Vitals:    12/16/20 1159 12/16/20 1249   BP: 120/83    Pulse: 82 72   Resp: 16    Temp: 97.8 °F (36.6 °C)    SpO2: 100% 100%   Weight: 73 kg (161 lb)    Height: 5' 11\" (1.803 m)             Physical Exam  Vitals signs and nursing note reviewed. Constitutional:       General: He is not in acute distress. Appearance: Normal appearance. He is not toxic-appearing. HENT:      Head: Normocephalic and atraumatic. Nose: Nose normal.      Mouth/Throat:      Mouth: Mucous membranes are moist.      Pharynx: Oropharynx is clear. Eyes:      Conjunctiva/sclera: Conjunctivae normal.      Pupils: Pupils are equal, round, and reactive to light. Neck:      Musculoskeletal: Normal range of motion and neck supple. Cardiovascular:      Rate and Rhythm: Normal rate and regular rhythm. Heart sounds: No murmur. Pulmonary:      Effort: Pulmonary effort is normal.      Breath sounds: Normal breath sounds. Abdominal:      General: There is no distension. Palpations: Abdomen is soft. Tenderness: There is no abdominal tenderness. There is no guarding. Musculoskeletal: Normal range of motion. General: No swelling. Skin:     General: Skin is warm and dry.    Neurological:      Mental Status: He is alert and oriented to person, place, and time. Psychiatric:         Mood and Affect: Mood normal.         Behavior: Behavior normal.          MDM  Number of Diagnoses or Management Options  Diagnosis management comments: Lab work is unremarkable except for slight leukocytosis 11.8. Troponin normal.  Chest x-ray normal.  EKG shows normal sinus rhythm without ST changes or ectopy. Patient does have family history and smokes otherwise no significant risk factors. He appears safe for outpatient follow-up with cardiology.        Amount and/or Complexity of Data Reviewed  Clinical lab tests: ordered and reviewed  Tests in the radiology section of CPT®: ordered and reviewed    Risk of Complications, Morbidity, and/or Mortality  Presenting problems: moderate  Diagnostic procedures: moderate  Management options: moderate           Procedures